# Patient Record
Sex: MALE | Race: WHITE
[De-identification: names, ages, dates, MRNs, and addresses within clinical notes are randomized per-mention and may not be internally consistent; named-entity substitution may affect disease eponyms.]

---

## 2019-11-27 ENCOUNTER — HOSPITAL ENCOUNTER (EMERGENCY)
Dept: HOSPITAL 25 - UCEAST | Age: 79
Discharge: HOME | End: 2019-11-27
Payer: MEDICARE

## 2019-11-27 VITALS — SYSTOLIC BLOOD PRESSURE: 132 MMHG | DIASTOLIC BLOOD PRESSURE: 64 MMHG
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
## 2019-11-27 DIAGNOSIS — Z87.891: ICD-10-CM

## 2019-11-27 DIAGNOSIS — S61.211A: Primary | ICD-10-CM
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
## 2019-11-27 DIAGNOSIS — Z88.5: ICD-10-CM

## 2019-11-27 DIAGNOSIS — Z79.82: ICD-10-CM

## 2019-11-27 DIAGNOSIS — I10: ICD-10-CM

## 2019-11-27 DIAGNOSIS — K21.9: ICD-10-CM

## 2019-11-27 DIAGNOSIS — E11.9: ICD-10-CM

## 2019-11-27 PROCEDURE — 12001 RPR S/N/AX/GEN/TRNK 2.5CM/<: CPT

## 2019-11-27 PROCEDURE — 99213 OFFICE O/P EST LOW 20 MIN: CPT

## 2019-11-27 PROCEDURE — 99212 OFFICE O/P EST SF 10 MIN: CPT

## 2019-11-27 PROCEDURE — G0463 HOSPITAL OUTPT CLINIC VISIT: HCPCS

## 2019-11-27 NOTE — ED
Laceration/Wound HPI





- HPI Summary


HPI Summary: 


79-year-old male presents with complaints of laceration to the tip of his left 

index finger.  States he accidentally cut his finger on with a sharp knife 

while deboning a pork chop about 1 hour ago.  Bleeding was controlled prior to 

arrival with direct pressure.  Reports full range of motion to the finger.  

Last tetanus was approximately 2 years ago.  Denies any numbness or tingling.








- History of Current Complaint


Stated Complaint: FINGER LAC


Time Seen by Provider: 11/27/19 16:29


Hx Obtained From: Patient


Pain Intensity: 0





- Additional Pertinent History


Primary Care Physician: [id number]





- Allergy/Home Medications


Allergies/Adverse Reactions: 


 Allergies











Allergy/AdvReac Type Severity Reaction Status Date / Time


 


oxycodone Allergy  Altered Verified 11/27/19 14:52





   Mental  





   Status  














PMH/Surg Hx/FS Hx/Imm Hx


Endocrine/Hematology History: Reports: Hx Diabetes - Type 2


Cardiovascular History: Reports: Hx Hypercholesterolemia, Hx Hypertension


   Denies: Hx Pacemaker/ICD


Respiratory History: Reports: Hx Sleep Apnea - pt stated he cannot tolerate 

wearing mask


   Denies: Hx Asthma


GI History: Reports: Hx Gastroesophageal Reflux Disease


 History: Reports: Other  Problems/Disorders - sees Dr. Garcia regularly 

for prostate


Musculoskeletal History: Reports: Hx Arthritis, Hx Back Problems


Sensory History: 


   Denies: Hx Cataracts, Hx Contacts or Glasses, Hx Glaucoma, Hx Hearing Aid


Opthamlomology History: 


   Denies: Hx Cataracts, Hx Contacts or Glasses, Hx Glaucoma


Neurological History: Reports: Other Neuro Impairments/Disorders - HX OF PINEAL 

CYCST MANY YEARS AGO/PAIN CLINIC PATIENT


Psychiatric History: 


   Denies: Hx Panic Disorder





- Surgical History


Surgery Procedure, Year, and Place: VERICOCELE 35 YRS AGO.  CYST REMOVED FROM 

INFRONT OF RT EAR. AS A CHILD.  LEFT HYDROCELE 2009 [place] [place]  LAMINECTOMY 4/5 12 /19/16





- Immunization History


Date of Tetanus Vaccine: 2 years ago


Infectious Disease History: Yes


Infectious Disease History: Reports: Hx Shingles - 2014


   Denies: Traveled Outside the [place] in Last 30 Days





- Family History


Known Family History: Positive: Cardiac Disease





- Social History


Occupation: Retired


Lives: With Family


Alcohol Use: Weekly


Alcohol Amount: 2-4 oz vodka daily


Substance Use Type: Reports: None


Smoking Status (MU): Former Smoker


Type: Cigars


Amount Used/How Often: 3 cigars/week


Have You Smoked in the Last Year: Yes





Review of Systems


Constitutional: Negative


Cardiovascular: Negative


Respiratory: Negative


Gastrointestinal: Negative


Genitourinary: Negative


Negative: Decreased ROM


Skin: Other - See HPI


Neurological: Negative


All Other Systems Reviewed And Are Negative: Yes





Physical Exam





- Summary


Physical Exam Summary: 


GENERAL APPEARANCE: Well developed, well nourished, alert and cooperative, and 

appears to be in no acute distress.





CARDIAC: Normal S1 and S2. No S3, S4 or murmurs. Rhythm is regular. There is no 

peripheral edema, cyanosis or pallor. Extremities are warm and well perfused. 

Capillary refill is less than 2 seconds. Peripheral pulses intact.





LUNGS: Clear to auscultation without rales, rhonchi, wheezing or diminished 

breath sounds.





ABDOMEN: Positive bowel sounds. Soft, nondistended, nontender. No guarding or 

rebound. No masses or hepatosplenomegally.





MUSKULOSKELETAL: ROM intact to all extremities. No joint erythema or 

tenderness. Normal muscular development. Normal gait.





EXTREMITIES: Linear superficial c-shaped flap laceration to the pad of his 

distal left index finger with bleeding controlled. No nail involvement.





SKIN: Skin normal color, texture and turgor.





Triage Information Reviewed: Yes


Vital Signs On Initial Exam: 


 Initial Vitals











Temp Pulse Resp BP Pulse Ox


 


 98.5 F   61   16   136/58   97 


 


 11/27/19 14:46  11/27/19 14:46  11/27/19 14:46  11/27/19 14:46  11/27/19 14:46











Vital Signs Reviewed: Yes





Procedures





- Laceration/Wound Repair


  ** 1


Location: upper extremity - left index finger


Description: Linear - flap laceration


Length, Depth and Shape: superficial 1 cm c-shaped flap laceration


Irrigated w/ Saline (ccs): 500


Laceration/Wound Explored: clean


Closure: Skin Adhesive, SteriStrips





Diagnostics





- Vital Signs


 Vital Signs











  Temp Pulse Resp BP Pulse Ox


 


 11/27/19 14:46  98.5 F  61  16  136/58  97














- Laboratory


Lab Statement: Any lab studies that have been ordered have been reviewed, and 

results considered in the medical decision making process.





Laceration Repair Course/Dx





- Course


Course Of Treatment: 79-year-old male presents with complaints of laceration to 

the tip of his left index finger.  States he accidentally cut his finger on 

with a sharp knife while deboning a pork chop about 1 hour ago.  Bleeding was 

controlled prior to arrival with direct pressure.  Reports full range of motion 

to the finger.  Last tetanus was approximately 2 years ago.  Denies any 

numbness or tingling.  Afebrile. Vital signs stable.  On exam patient was noted 

to have a linear superficial flap laceration to the pad of his distal left 

index finger with bleeding controlled. No nail involvement. The wound was 

thoroughly irrigated by the RN prior to wound repair.  The wound margins were 

well approximated therefore decision was made to close the wound using Steri-

Strips and a skin adhesive.  A clean gauze dressing was applied by the RN.  

Wound care, anticipatory guidance, and warning symptoms were reviewed with the 

patient.  Verbalizes understanding and agrees with plan of care.





- Differential Dx


Differental Diagnoses: Laceration, Tendon Laceration





- Clinical Impression


Provider Diagnoses: 


 Laceration of left index finger








Discharge ED





- Sign-Out/Discharge


Documenting (check all that apply): Patient Departure


All imaging exams completed and their final reports reviewed: No Studies





- Discharge Plan


Condition: Stable


Disposition: HOME


Patient Education Materials:  Finger Laceration (ED), Skin Adhesive Care (ED), 

Steristrips (ED)


Referrals: 


Claudia Sung MD [Primary Care Provider] - 


Additional Instructions: 


Your laceration as repaired with a combination of skin adhesive and Steri-

Strips.





The adhesive will slowly wear off over the next several days.





Keep the adhesive dry for the next 24 hours. After 24 hours you may shower and 

wash your hands as usual.





Do not apply any lotions or ointments to the adhesive as this may dissolve the 

adhesive and cause the wound to reopen.





The Steri-Strips will slowly peel up from the ends over the next few days. You 

may trim the ends as needed but do not pull off or you may reopen the wound.





Keep the wound covered with a dressing. Change this at least once a day or 

anytime the dressing becomes wet or soiled.





Take acetaminophen (Tylenol) or ibuprofen (Advil, Motrin) according to 

directions as needed for pain.





Watch for signs of infection including fever greater than 100.5 F, severe pain 

not managed with with pain medicine, redness that spreads, swelling of the 

finger, pus draining from the wound, or any worsening of symptoms. Seek 

immediate medical attention if any of these occur.





- Billing Disposition and Condition


Condition: STABLE


Disposition: Home

## 2019-12-03 NOTE — XMS REPORT
Continuity of Care Document (CCD)

 Created on:2019



Patient:Harjinder Walsh

Sex:Male

:1940

External Reference #:[id number]





Demographics







 Address  [address] [place] [address] [address]



   [place] [address]

 

 Home Phone  [phone]

 

 Mobile Phone  [phone]

 

 Work Phone  [phone]

 

 Email Address  [email or web address]

 

 Preferred Language  en

 

 Marital Status  Not  or 

 

 Latter day Affiliation  Unknown

 

 Race  White

 

 Ethnic Group  Not  or 









Author







 Name  Claudia Taylor MD

 

 Address  [address] [place] [id number]









Care Team Providers







 Name  Role  Phone

 

 Carrillo Gale M.D. (Neurosurgeon)  Care Team Information   [phone]
[phone]

 

 Griselda Heredia  Care Team Information   [phone]









Problems







 Active Problems  Provider  Date

 

 Benign essential hypertension  Cathie Witt RN MS FNP  Onset: 2007

 

 Pure hypercholesterolemia  Cathie Witt RN MS FNP  Onset: 2007

 

 Peptic reflux disease  Cathie Witt, RN MS FNP  Onset: 2007

 

 Type 2 diabetes mellitus  Claudia Taylor MD  Onset: 2011

 

 Mixed hyperlipidemia  Claudia Taylor MD  Onset: 2018

 

 Essential hypertension  Claudia Taylor MD  Onset: 2016







Social History







 Type  Date  Description  Comments

 

 Birth Sex    Unknown  

 

 Tobacco Use  Start: Unknown  Currently Smokes an Occasional  quit



     Cigar  

 

 ETOH Use    consumes 3-4 glasses per week  

 

 Tobacco Use  Start: Unknown End: Unknown  Patient is a former smoker  

 

 Smoking Status  Reviewed: 10/19/18  Patient is a former smoker  







Allergies, Adverse Reactions, Alerts







 Active Allergies  Reaction  Severity  Comments  Date

 

 Oxycodone      mental status changes  2017









 Inactive Allergies









 NKDA        2007







Medications







 Active Medications  SIG  Qnty  Indications  Ordering  Date



         Provider  

 

 Meclizine HCL  take 1/2-1 tablet  15tabs  H81.10  Claudia Taylor  10/08/2019



             25mg  by mouth three      MD ARIES  



 Tablets  times daily as        



   needed        

 

 Magnesium Oxide  1 by mouth every    E83.42  Claudia Taylor  2018



               250mg  night at bedtime      MD ARIES  



 Tablets          



           

 

 Cpap      G47.33  [facility]  04/10/2018



         MD ARIES  

 

 Cardizem LA  1 by mouth every  90tabs  I10  [facility]  2018



           240mg  day      MD ARIES  



 Tablets ER 24HR          



           

 

 Atorvastatin Calcium  Take 1 Tablet AT  90tabs  E78.2  [facility]  2018



   Bedtime      MD ARIES  



 20mg Tablets          



           

 

 Nystatin  apply to affected  90gm    [facility]  2018



        [id number]/GM  area(s) two times      MD ARIES  



 Cream  a day        



           

 

 Valacyclovir HCL  Take Two Tablets  30tabs    [facility]  2017



                1gm  By Mouth Twice A      M,MD  



 Tablets  Day For 1 Day as        



   Needed For Cold        



   Sore        

 

 Aspirin  1 po qd      [facility]  2014



       81mg Tablets        MD ARIES  



           

 

 Losartan  take 1 tablet  90tabs  I10  [facility]  2011



 Potassium/Hydrochloro  daily      MD ARIES  



 thiazide          



        100-25mg          



 Tablets          



           

 

 Test Strips  For FSBG'S qd  1Box  E11.9  [facility]  2010



         MD ARIES  

 

 Lancets  for fsbg's  qam  1Box  E11.9  [facility]  2010



        Misc  dx: dm 2      MD ARIES  



           

 

 Omeprazole  1 by mouth twice  180caps  K21.0  [facility]  2010



          20mg  a day      MD ARIES  



 Capsules DR          



           

 

 Vitamin D  2 po qd    E55.9  [facility]  2009



         1000Unit        MD ARIES  



 Capsules          



           

 

 Metformin HCL ER  2 by mouth twice  360tabs  E11.9  [facility]  2009



                500mg  a day      MD ARIES  



 Tablets ER 24HR          



           







Immunizations







 CPT Code  Status  Date  Vaccine  Lot #

 

 [id number]  Given  10/04/2019  Shingrix (Shingles) Zoster Vaccine HZV,  



       Recombinant, Subunit, Adj  

 

 37317  Given  10/04/2019  Fluzone Highdose Age 65 And Over Preservative &  



       Antibiotic Free  

 

 56064  Given  2019  Shingrix (Shingles) Zoster Vaccine HZV,  



       Recombinant, Subunit, Adj  

 

 43340  Given  2018  Influenza Vac, Quadrivalent, Split, 0.5mL Dosage,  
[id number]



       Im Use  

 

 40917  Given  2017  Pneumococcal 23 Immunization Adult Or  [id number]



       Immunosuppressed Patient  

 

 88218  Given  2017  Influenza Vac, Quadrivalent, Split, 0.5mL Dosage,  
[id number]



       Im Use  

 

 84675  Given  2016  Tdap (Adacel) Ages 7 And Above Only  [id number]

 

 92874  Given  09/10/2016  Influenza Virus Vaccine,Quadrivalent,Split,Preserv  



       Free, 0.5mL,Im  

 

 95407  Given  2016  Influenza Vac, Quadrivalent, Split, 0.5mL Dosage,  



       Im Use  

 

 10218  Given  10/22/2015  Afluria Or Fluvirin Flu Vac Intramuscular  

 

 54236  Given  2015  Prevnar 13 Pneumococal Conjugate Vaccine  [id number]

 

 79993  Given  2015  Zoster (Zostavax)  

 

   Given  2014  Fluzone Trivalent Immunization  [id number]

 

   Given  10/07/2013  Fluzone Trivalent Immunization  [id number]

 

   Given  10/11/2012  Fluzone Trivalent Immunization  [id number]

 

   Given  2011  Fluzone Trivalent Immunization  [id number]

 

 65597  Given  2010  Afluria Or Fluvirin Flu Vac Intramuscular  [id number]

 

 55586  Given  2009  Influenza Virus Vaccine Pandemic Formulation -  
[id number]



       [id number]  

 

 29505  Given  2009  Afluria Or Fluvirin Flu Vac Intramuscular  

 

 21377  Given  10/21/2008  Pneumococcal 23 Immunization Adult Or  [id number]



       Immunosuppressed Patient  

 

 81290  Given  10/16/2007  Afluria Or Fluvirin Flu Vac Intramuscular  

 

 37514  Given  10/16/2007  Afluria Or Fluvirin Flu Vac Intramuscular  [id number]







Vital Signs







 Date  Vital  Result  Comment

 

 10/08/2019  8:20am  Weight  247.00 lb  









 Heart Rate  68 /min  

 

 BP Systolic  142 mmHg  

 

 BP Diastolic  66 mmHg  

 

 BP Systolic Recheck  136 mmHg  

 

 BP Diastolic Recheck  64 mmHg  

 

 Height  69 inches  5'9"

 

 BMI (Body Mass Index)  36.5 kg/m2  









 2019 11:33am  Weight  233.00 lb  









 Heart Rate  72 /min  

 

 BP Systolic  114 mmHg  

 

 BP Diastolic  64 mmHg  

 

 Height  69 inches  5'9"

 

 BMI (Body Mass Index)  34.4 kg/m2  







Results







 Test  Date  Facility  Test  Result  H/L  Range  Note

 

 Laboratory test finding  10/08/2019  [facility]  Hemoglobin A1c  <pending>      

 

 Laboratory test finding  10/08/2019  [facility]  Magnesium  <pending>      









 Vit D 25Oh  <pending>      









 Laboratory test  10/01/2019  [facility]  PSA Diagnostic  6.501 ng/
mL  High  0-4.0  1



 finding              









 1  Serum levels of PSA measured using the Lakisha Arelis



   DXI Hybritech immunoassay should not be interpreted as



   absolute evidence of the presence or absence of disease.



   The PSA value should be used in conjunction with other



   pertinent clinical diagnostic procedures.



   



   The values obtained with different assay methods or kits



   cannot be used interchangeably.







Procedures







 Date  Code  Description  Status

 

 10/08/2019  26047  Electrocardiogram Complete  Completed

 

 2010  03025402  Colonoscopy  Completed







Medical Devices







 Description

 

 No Information Available







Encounters







 Description

 

 No Information Available







Assessments







 Date  Code  Description  Provider

 

 10/08/2019  E66.9  Obesity, unspecified  Claudia Taylor MD

 

 10/08/2019  Z00.01  Encounter for general adult medical  Claudia Taylor MD



     examination with abnormal findings  

 

 10/08/2019  I10  Essential (primary) hypertension  Claudia Taylor MD

 

 10/08/2019  E11.9  Type 2 diabetes mellitus without  Claudia Taylor MD



     complications  

 

 10/08/2019  E55.9  Vitamin D deficiency, unspecified  Claudia Taylor MD

 

 10/08/2019  I77.810  Thoracic aortic ectasia  Claudia Taylor MD

 

 10/08/2019  M48.07  Spinal stenosis, lumbosacral region  Claudia Taylor MD

 

 10/08/2019  E78.2  Mixed hyperlipidemia  Claudia Taylor MD

 

 10/08/2019  K21.0  Gastro-esophageal reflux disease with  Claudia Taylor MD



     esophagitis  

 

 10/08/2019  G47.33  Obstructive sleep apnea (adult) (pediatric)  Claudia Taylor MD

 

 10/08/2019  Z12.11  Encounter for screening for malignant  Claudia Taylor MD



     neoplasm of colon  

 

 10/08/2019  Z13.31  Encounter for screening for depression  Claduia Taylor MD

 

 10/08/2019  E83.42  Hypomagnesemia  Claudia Taylor MD

 

 10/08/2019  H81.10  Benign paroxysmal vertigo, unspecified ear  Claudia Taylor MD

 

 10/08/2019  Z68.36  Body mass index (BMI) 36.0-36.9, adult  Claudia Taylor MD







Plan of Treatment

10/08/2019 - Claudia Taylor MDE66.9 Obesity, nnvtysvntfpW16.01 Encounter 
for general adult medical examination with abnormal feyzokqeL92 Essential (
primary) ykcutrbaglxtR17.9 Type 2 diabetes mellitus without hgirfnmekthyxD52.9 
Vitamin D deficiency, cbdhyuchxknR01.810 Thoracic aortic vrmggbyP08.07 Spinal 
stenosis, lumbosacral emnxahJ37.2 Mixed hyperlipidemiaFollow up:4 mosK21.0 
Gastro-esophageal reflux disease with qzxfdqptdlmV09.33 Obstructive sleep apnea 
(adult) (pediatric)Z12.11 Encounter for screening for malignant neoplasm of 
lixiyG17.31 Encounter for screening for rnnykfzdxgQ44.42 MofibillolgvqtN16.10 
Benign paroxysmal vertigo, unspecified earNew Medication:Meclizine HCL 25 mg - 
take 1/2-1 tablet by mouth three times daily as jrxwidK49.36 Body mass index (
BMI) 36.0-36.9, adult



Functional Status







 Description

 

 No Information Available







Mental Status







 Description

 

 No Information Available







Referrals







 Description

 

 No Information Available

## 2019-12-03 NOTE — XMS REPORT
Continuity of Care Document (CCD)

 Created on:2019



Patient:Harjinder Walsh

Sex:Male

:1940

External Reference #:[id number]





Demographics







 Address  [address] [place] [address] [address]



   [place] [address]

 

 Home Phone  [phone]

 

 Mobile Phone  [phone]

 

 Email Address  [email or web address]

 

 Preferred Language  en

 

 Marital Status  Not  or 

 

 Episcopal Affiliation  Unknown

 

 Race  White

 

 Ethnic Group  Not  or 









Author







 Name  Griselda Heredia M.D. (transmitted by agent of provider Fátima Wiggins)

 

 Address  [address] [place] [address]









Care Team Providers







 Name  Role  Phone

 

 Claudia Taylor MD - Internal  Care Team Information   [phone]
[phone]



 Medicine    









Problems







 Active Problems  Provider  Date

 

 Morbid obesity  Griselda Heredia M.D.  Onset: 2016

 

 Mixed hyperlipidemia  rGiselda Heredia M.D.  Onset: 2016

 

 Atherosclerotic heart disease of native  Griselda Heredia M.D.  Onset: 2016



 coronary artery with unspecified angina    



 pectoris    

 

 Body mass index 40+ - severely obese  Griselda Heredia M.D.  Onset: 05/10/2016

 

 Atherosclerotic heart disease of native  Griselda Heredia M.D.  Onset: 05/10/2016



 coronary artery without angina pectoris    

 

 Essential hypertension  Griselda Heredia M.D.  Onset: 2017

 

 Neurogenic claudication co-occurrent and due to  Bobby Kruse M.D.  Onset: 



 spinal stenosis of lumbar region    







Social History







 Type  Date  Description  Comments

 

 Birth Sex    Unknown  

 

 Tobacco Use  Start: Unknown  Never Smoked Cigarettes  

 

 ETOH Use    Denies alcohol use  no alcohol since 

 

 ETOH Use    Consumes 1 glass of  



     wine per day  

 

 Recreational Drug Use    Denies Drug Use  

 

 Tobacco Use  Start: Unknown  Patient is a former  smoked occasional



   End: Unknown  smoker  cigar- pt no longer



       smokes, pt only chews



       on them

 

 Smoking Status  Reviewed: 10/22/19  Patient is a former  smoked occasional



     smoker  cigar- pt no longer



       smokes, pt only chews



       on them

 

 Exercise Type/Frequency    Exercises sporadically  







Allergies, Adverse Reactions, Alerts







 Active Allergies  Reaction  Severity  Comments  Date

 

 Oxycodone  unconsciousness  Severe    2017









 Inactive Allergies









 NKDA        2014







Medications







 Active Medications  SIG  Qnty  Indications  Ordering Provider  Date

 

 Magnesium Oxide  1 by mouth  90caps    Griselda Heredia M.D.  2017



              400mg  every day        



 Capsules          



           

 

 Atorvastatin Calcium  Take 1 tab (20  60tabs    Macadam, Claudia  



                   20mg  mg) MAURICIO WALLS MD  



 Tablets          



           

 

 Omeprazole  1 t po bid      Unknown  



         20mg Capsules          



 DR          

 

 Hyzaar  1 by mouth  90tabs    Unknown  



     100-25mg Tablets  every day        



           

 

 Aspir-81  1 by mouth      Unknown  



       81mg Tablets DR  every day        



           

 

 Diltiazem HCL ER  1 by mouth      Unknown  



               240mg  every day        



 Caps ER 24HR          



           

 

 Multivitamin Adult  1 by mouth      Unknown  



   every day        



 Tablets          



           

 

 Vitamin D-3  1 by mouth      Unknown  



          1000Unit  every day        



 Capsules          



           

 

 Cpap  nasal Cpap      Unknown  



    Device          



           







Medications Administered in Office







 Medication  SIG  Qnty  Indications  Ordering Provider  Date

 

 Technetium TC 99M        Griselda Heredia M.D.  11/10/2017



 Tetrofosmin, Per Unit Dose          



 Up To 40 Millicuries          



              Injection          



           

 

 Technetium TC 99M        Servando Gage M.D.,  2017



 Tetrofosmin, Per Unit Dose        [facility]  



 Up To 40 Millicuries          



              Injection          



           

 

 Inj, Regadenoson, 0.1 MG        Robert Wagner, DO [facility]  2016



                  Injection          



           

 

 Technetium TC 99M        Robert Wagner, DO [facility]  2016



 Tetrofosmin, Per Unit Dose          



 Up To 40 Millicuries          



              Injection          



           







Immunizations







 Description

 

 No Information Available







Vital Signs







 Date  Vital  Result  Comment

 

 10/22/2019  8:36am  Height  69 inches  5'9"









 Weight  244.00 lb  with shoes

 

 Heart Rate  74 /min  

 

 BP Systolic Sitting  136 mmHg  Ra

 

 BP Diastolic Sitting  76 mmHg  Ra

 

 BP Systolic Standing  126 mmHg  Ra

 

 BP Diastolic Standing  70 mmHg  Ra

 

 BMI (Body Mass Index)  36.0 kg/m2  

 

 Ejection Fraction  65-70%  Echo 2016









 10/23/2018  8:41am  Height  69 inches  5'9"









 Weight  231.00 lb  

 

 Heart Rate  74 /min  regular

 

 BP Systolic Sitting  122 mmHg  large cuff  left arm

 

 BP Diastolic Sitting  70 mmHg  large cuff  left arm

 

 BP Systolic Standing  134 mmHg  

 

 BP Diastolic Standing  72 mmHg  

 

 Respiratory Rate  16 /min  

 

 Pain Level  0  

 

 O2 % BldC Oximetry  97 %  

 

 BMI (Body Mass Index)  34.1 kg/m2  







Results







 Description

 

 No Information Available







Procedures







 Date  Code  Description  Status

 

 10/22/2019  34811  EKG Tracing & Interpretation  Completed







Medical Devices







 Description

 

 No Information Available







Encounters







 Type  Date  Location  Provider  Dx  Diagnosis

 

 Office Visit  10/22/2019  [facility]  Griselda Heredia  I25.10  Athscl 
heart



   9:00a  Of Mary HUTCHINSON    disease of native



           coronary artery



           w/o ang pctrs









 E78.2  Mixed hyperlipidemia

 

 I10  Essential (primary) hypertension

 

 R00.2  Palpitations

 

 Z68.36  Body mass index (BMI) 36.0-36.9, adult







Assessments







 Date  Code  Description  Provider

 

 10/22/2019  I25.10  Atherosclerotic heart disease of native coronary  Griselda Heredia M.D.



     artery without angina pectoris  

 

 10/22/2019  E78.2  Mixed hyperlipidemia  Griselda Heredia M.D.

 

 10/22/2019  I10  Essential (primary) hypertension  Griselda Heredia M.D.

 

 10/22/2019  R00.2  Palpitations  Griselda Heredia M.D.

 

 10/22/2019  Z68.36  Body mass index (BMI) 36.0-36.9, adult  Griselda Heredia M.D.







Plan of Treatment

10/22/2019 - Griselda Heredia M.D.I25.10 Atherosclerotic heart disease of native 
coronary artery without angina pectorisComments:Your ECG is normal.Follow up:1 
yearE78.2 Mixed hyperlipidemiaComments:Good control. LDL goal is &lt;= 70 with 
coronary artery disease.Recommendations:Continue current medication  Healthy 
fats are nuts, oily fish and more. Avoid excessive fatty meats.For your 
elevated triglycerides and weight: Minimize foods with refined sugar, flour.  
Low "white" foods, white flour, sugar, rice, potatoes.I10 Essential (primary) 
hypertensionComments:Well controlled on Diltiazem, Hyzaar.R00.2 
PalpitationsComments:With rest, improved with Magnesium, history of PVC'
s.Z68.36 Body mass index (BMI) 36.0-36.9, adultComments:Continue to work on diet
: smaller portions, more regular meals to decrease the chance of glucose/sugar 
problems.  Mediterranean diet, avoid/minimize concentrated sweets and refined 
carbohydrates: low "white" foods, white sugar, white flour, white rice, 
potatoes...



Functional Status







 Description

 

 No Information Available







Mental Status







 Description

 

 No Information Available







Referrals







 Description

 

 No Information Available

## 2019-12-03 NOTE — XMS REPORT
Continuity of Care Document (CCD)

 Created on:October 10, 2019



Patient:Harjinder Walsh

Sex:Male

:1940

External Reference #:[id number]





Demographics







 Address  [address] [place] [address] [address]



   [place] [address]

 

 Home Phone  [phone]

 

 Mobile Phone  [phone]

 

 Work Phone  [phone]

 

 Email Address  [email or web address]

 

 Preferred Language  en

 

 Marital Status  Not  or 

 

 Worship Affiliation  Unknown

 

 Race  White

 

 Ethnic Group  Not  or 









Author







 Name  Dora George









Care Team Providers







 Name  Role  Phone

 

 Carrillo Gale M.D. (Neurosurgeon)  Care Team Information   [phone]
[phone]

 

 Griselda Heredia  Care Team Information   [phone]









Problems







 Active Problems  Provider  Date

 

 Benign essential hypertension  Cathie Witt RN MS FNP  Onset: 2007

 

 Pure hypercholesterolemia  Cathie Witt RN MS FNP  Onset: 2007

 

 Peptic reflux disease  Cathie Witt RN MS FNP  Onset: 2007

 

 Type 2 diabetes mellitus  Claudia Taylor MD  Onset: 2011

 

 Mixed hyperlipidemia  Claudia Taylor MD  Onset: 2018

 

 Essential hypertension  Claudia Taylor MD  Onset: 2016







Social History







 Type  Date  Description  Comments

 

 Birth Sex    Unknown  

 

 Tobacco Use  Start: Unknown  Currently Smokes an Occasional  quit



     Cigar  

 

 ETOH Use    consumes 3-4 glasses per week  

 

 Tobacco Use  Start: Unknown End: Unknown  Patient is a former smoker  

 

 Smoking Status  Reviewed: 10/19/18  Patient is a former smoker  







Allergies, Adverse Reactions, Alerts







 Active Allergies  Reaction  Severity  Comments  Date

 

 Oxycodone      mental status changes  2017









 Inactive Allergies









 NKDA        2007







Medications







 Active Medications  SIG  Qnty  Indications  Ordering  Date



         Provider  

 

 Meclizine HCL  take 1/2-1 tablet  15tabs  H81.10  Claudia Taylor  10/08/2019



             25mg  by mouth three      MD ARIES  



 Tablets  times daily as        



   needed        

 

 Magnesium Oxide  1 by mouth every    E83.42  Claudia Taylor  2018



               250mg  night at bedtime      MD ARIES  



 Tablets          



           

 

 Cpap      G47.33  [facility]  04/10/2018



         MD ARIES  

 

 Cardizem LA  1 by mouth every  90tabs  I10  [facility]  2018



           240mg  day      MD ARIES  



 Tablets ER 24HR          



           

 

 Atorvastatin Calcium  Take 1 Tablet AT  90tabs  E78.2  [facility]  2018



   Bedtime      MD ARIES  



 20mg Tablets          



           

 

 Nystatin  apply to affected  90gm    [facility]  2018



        [id number]/GM  area(s) two times      MD ARIES  



 Cream  a day        



           

 

 Valacyclovir HCL  Take Two Tablets  30tabs    [facility]  2017



                1gm  By Mouth Twice A      M,MD  



 Tablets  Day For 1 Day as        



   Needed For Cold        



   Sore        

 

 Aspirin  1 po qd      [facility]  2014



       81mg Tablets        MD ARIES  



           

 

 Losartan  take 1 tablet  90tabs  I10  [facility]  2011



 Potassium/Hydrochloro  daily      MD ARIES  



 thiazide          



        100-25mg          



 Tablets          



           

 

 Test Strips  For FSBG'S qd  1Box  E11.9  [facility]  2010



         MD ARIES  

 

 Lancets  for fsbg's  qam  1Box  E11.9  [facility]  2010



        Misc  dx: dm 2      MD ARIES  



           

 

 Omeprazole  1 by mouth twice  180caps  K21.0  [facility]  2010



          20mg  a day      MD ARIES  



 Capsules DR          



           

 

 Vitamin D  2 po qd    E55.9  [facility]  2009



         1000Unit        MD ARIES  



 Capsules          



           

 

 Metformin HCL ER  2 by mouth twice  360tabs  E11.9  [facility]  2009



                500mg  a day      MD ARIES  



 Tablets ER 24HR          



           







Immunizations







 CPT Code  Status  Date  Vaccine  Lot #

 

 [id number]  Given  10/04/2019  Shingrix (Shingles) Zoster Vaccine HZV,  



       Recombinant, Subunit, Adj  

 

 73460  Given  10/04/2019  Fluzone Highdose Age 65 And Over Preservative &  



       Antibiotic Free  

 

 26883  Given  2019  Shingrix (Shingles) Zoster Vaccine HZV,  



       Recombinant, Subunit, Adj  

 

 92614  Given  2018  Influenza Vac, Quadrivalent, Split, 0.5mL Dosage,  
[id number]



       Im Use  

 

 16208  Given  2017  Pneumococcal 23 Immunization Adult Or  [id number]



       Immunosuppressed Patient  

 

 29980  Given  2017  Influenza Vac, Quadrivalent, Split, 0.5mL Dosage,  
[id number]



       Im Use  

 

 55890  Given  2016  Tdap (Adacel) Ages 7 And Above Only  [id number]

 

 49209  Given  09/10/2016  Influenza Virus Vaccine,Quadrivalent,Split,Preserv  



       Free, 0.5mL,Im  

 

 97782  Given  2016  Influenza Vac, Quadrivalent, Split, 0.5mL Dosage,  



       Im Use  

 

 57498  Given  10/22/2015  Afluria Or Fluvirin Flu Vac Intramuscular  

 

 59693  Given  2015  Prevnar 13 Pneumococal Conjugate Vaccine  [id number]

 

 50290  Given  2015  Zoster (Zostavax)  

 

   Given  2014  Fluzone Trivalent Immunization  [id number]

 

   Given  10/07/2013  Fluzone Trivalent Immunization  [id number]

 

   Given  10/11/2012  Fluzone Trivalent Immunization  [id number]

 

   Given  2011  Fluzone Trivalent Immunization  [id number]

 

 41352  Given  2010  Afluria Or Fluvirin Flu Vac Intramuscular  [id number]

 

 20528  Given  2009  Influenza Virus Vaccine Pandemic Formulation -  
[id number]



       32653-801-37  

 

 14117  Given  2009  Afluria Or Fluvirin Flu Vac Intramuscular  

 

 38777  Given  10/21/2008  Pneumococcal 23 Immunization Adult Or  [id number]



       Immunosuppressed Patient  

 

 64690  Given  10/16/2007  Afluria Or Fluvirin Flu Vac Intramuscular  

 

 71296  Given  10/16/2007  Afluria Or Fluvirin Flu Vac Intramuscular  [id number]







Vital Signs







 Date  Vital  Result  Comment

 

 10/08/2019  8:20am  Weight  247.00 lb  









 Heart Rate  68 /min  

 

 BP Systolic  142 mmHg  

 

 BP Diastolic  66 mmHg  

 

 BP Systolic Recheck  136 mmHg  

 

 BP Diastolic Recheck  64 mmHg  

 

 Height  69 inches  5'9"

 

 BMI (Body Mass Index)  36.5 kg/m2  









 2019 11:33am  Weight  233.00 lb  









 Heart Rate  72 /min  

 

 BP Systolic  114 mmHg  

 

 BP Diastolic  64 mmHg  

 

 Height  69 inches  5'9"

 

 BMI (Body Mass Index)  34.4 kg/m2  







Results







 Test  Date  Facility  Test  Result  H/L  Range  Note

 

 CBC with Auto Diff-fcmg  10/08/2019  [facility]  WBC  6.7 K/uL    4.1-11.0  1









 RBC  4.82 M/uL    4.60-6.10  

 

 Hemoglobin  14.7 gm/dL    13.5-18.0  

 

 Hematocrit  42.3 %    41.0-53.0  

 

 MCV  87.8 fL    80.0-97.0  

 

 MCH  30.6 pg    27.0-32.0  

 

 MCHC  34.8 g/dL    32.0-36.0  

 

 RDW  14.3 %    11.5-14.5  

 

 PLT Count  179 K/ul    140-400  

 

 MPV  8.9 FL    7.1-10.7  

 

 Neutrophil  65.2 %    35.0-75.0  

 

 Lymphocyte  21.4 %    16.0-52.0  

 

 Monocyte  10.6 %  High  2.0-10.0  

 

 Eosinophil  2.2 %    0.0-5.0  

 

 Basophil  0.6 %    0.0-4.0  

 

 Abs Neutrophils  4.3 K/uL    2.1-8.0  

 

 Abs Lymphocytes  1.4 K/uL    0.8-5.5  

 

 Abs Monocytes  0.7 K/uL    0.1-1.0  

 

 Abs Eosinophils  0.1 K/uL    0.0-0.5  

 

 Abs Basophils  0.0 K/uL    0.0-0.3  









 Comprehensive Met Panel-[facility]  10/08/2019  [facility]  Sodium  134 mmol/L  Low  135
-146  2









 Potassium  4.3 mmol/L    3.5-5.2  

 

 Chloride#  95 mmol/L  Low    3

 

 Carbon Dioxide  31 mmol/L    24-34  

 

 Calcium  9.7 mg/dL    8.5-10.5  4

 

 Glucose  103 mg/dL      

 

 BUN  14 mg/dL    6-26  

 

 Creatinine  0.7 mg/dL    0.5-1.4  

 

 Total Protein  6.8 g/dL    6.0-8.0  

 

 Albumin  4.1 g/dL    3.6-4.9  

 

 Globulin  2.7 g/dL    2.0-3.5  

 

 A/G Ratio  1.5 Ratio    1.0-2.2  

 

 Total Bilirubin  0.6 mg/dL    0.1-1.3  

 

 Alkaline Phosphatase  56 U/L      

 

 Alt  16 U/L    3-42  

 

 Ast  21 U/L    8-42  

 

 Anion Gap  8 mmol/L    5-15  5

 

 Female Egfr  84    >60  6

 

 Male Egfr  91    >60  7









 Hemoglobin A1c  10/08/2019  [facility]  Hemoglobin A1c  5.8 %    4.1-5.9  









 Estimated Average Glucose Calc  120 mg/dL      









 Lipid  10/08/2019  [facility]  Cholesterol  136 mg/dL      









 Triglycerides  90 mg/dL      

 

 HDL  47 mg/dL    29-71  8

 

 Chol/ HDL Ratio  2.9 ratio  Low  4.0-6.7  

 

 VLDL  18 mg/dL    2-29  

 

 LDL (Calc)  72 mg/dL    20-99  9









 Laboratory test finding  10/08/2019  [facility]  Magnesium  1.8 mg/dL    1.5-2.7  









 Vitamin D 25 Hydroxy  48 ng/mL      10









 Microalb/Creat Panel  10/08/2019  [facility]  Creatinine, Urine  74.0 mg/dL      
11









 Microalbumin  < 7.0 ug/ml    5.0-20.0  









 Laboratory test  10/01/2019  [facility]  PSA Diagnostic  6.501 ng/
mL  High  0-4.0  12



 finding              









 1  This sample is drawn by:NB.

 

 2  Updated reference range on new analyzer 3-2017

 

 3  Updated reference range on new analyzer 3-2017

 

 4  Updated reference range 2019

 

 5  Updated Reference Range 

 

 6  Concerning GFR Guidelines for  Americans:



   Normal function or mild renal disease, if clinically at risk:  >/= 60



   mL/min



   Moderately decreased:  30-59



   Severely decreased:  15-29



   Renal failure:  <15



   There is reduced accuracy above 60ml/min/1.73 m squared, but the



   numeric value may be clinically useful in the near 60 range

 

 7  Concerning GFR Guidelines:



   Normal function or mild renal disease, if clinically at risk:  >/= 60



   mL/min



   Moderately decreased:  30-59



   Severely decreased:  15-29



   Renal failure:  <15



   There is reduced accuracy above 60ml/min/1.73 m squared, but the



   numeric value may be clinically useful in the near 60 range



   



   Glomerular Filtration Rate (GFR) is estimated based on the CKD-EPI



   equation, which assumes a steady state for creatinine as recommended



   by the National Kidney Disease Education Program in conjunction with



   the National Institutes of Health and the National Kidney Foundation.



   



   Clinical conditions in which it may be necessary to measure GFR by



   using clearance methods include extremes of age and body size, severe



   malnutrition or obesity, diseases of skeletal muscle, paraplegia or



   quadriplegia, vegetarian diet, rapidly changing kidney function, and



   calculation of the dose of potentially toxic drugs that are excreted



   by the kidneys.

 

 8  Per NCEP ATP III Guidelines:



   Results lower than 40 mg/dL are suggestive of increased risk for



   coronary artery disease.  Results > or = to 60 mg/dL are considered a



   negative risk factor.

 

 9  Per NCEP ATP III Guidelines:



   Normal Population <130



   Patients with medical conditions: CHD/DM



   Optimal: <100



   Borderline high: 130-159



   High: 160-189



   Very high: >189

 

 10  Clinical Guidelines for recommended serum 25(OH)Vitamin D



   Deficient at less than 20 ng/mL



   Insufficient at 20 to <30 ng/mL



   Sufficient at  ng/mL



   Toxicity at greater than 100 ng/mL

 

 11  Unable to calculate ratio. Microalbumin <7.0

 

 12  Serum levels of PSA measured using the [employer]



   DXI Hybritech immunoassay should not be interpreted as



   absolute evidence of the presence or absence of disease.



   The PSA value should be used in conjunction with other



   pertinent clinical diagnostic procedures.



   



   The values obtained with different assay methods or kits



   cannot be used interchangeably.







Procedures







 Date  Code  Description  Status

 

 10/08/2019  33272  Electrocardiogram Complete  Completed

 

 2010  [id number]  Colonoscopy  Completed







Medical Devices







 Description

 

 No Information Available







Encounters







 Description

 

 No Information Available







Assessments







 Date  Code  Description  Provider

 

 10/08/2019  E66.9  Obesity, unspecified  Claudia Taylor MD

 

 10/08/2019  Z00.01  Encounter for general adult medical  Claudia Taylor MD



     examination with abnormal findings  

 

 10/08/2019  I10  Essential (primary) hypertension  Claudia Taylor MD

 

 10/08/2019  E11.9  Type 2 diabetes mellitus without  Claudia Taylor MD



     complications  

 

 10/08/2019  E55.9  Vitamin D deficiency, unspecified  Claudia Taylor MD

 

 10/08/2019  I77.810  Thoracic aortic ectasia  Claudia Taylor MD

 

 10/08/2019  M48.07  Spinal stenosis, lumbosacral region  Claudia Taylor MD

 

 10/08/2019  E78.2  Mixed hyperlipidemia  Claudia Taylor MD

 

 10/08/2019  K21.0  Gastro-esophageal reflux disease with  Claudia Taylor MD



     esophagitis  

 

 10/08/2019  G47.33  Obstructive sleep apnea (adult) (pediatric)  Claudia Taylor MD

 

 10/08/2019  Z12.11  Encounter for screening for malignant  Claudia Taylor MD



     neoplasm of colon  

 

 10/08/2019  Z13.31  Encounter for screening for depression  Claudia Taylor MD

 

 10/08/2019  E83.42  Hypomagnesemia  Claudia Taylor MD

 

 10/08/2019  H81.10  Benign paroxysmal vertigo, unspecified ear  Claudia Taylor MD

 

 10/08/2019  Z68.36  Body mass index (BMI) 36.0-36.9, adult  Claudia Taylor MD

 

 10/08/2019  E11.9  Type 2 diabetes mellitus without  [facility]



     complications  







Plan of Treatment

Future Appointment(s):2020  8:15 am - Claudia Taylor MD at [facility]
/2019 - Claudia Taylor MDE66.9 Obesity, vaambdmjazvC45.01 Encounter for 
general adult medical examination with abnormal jpjhnivlU76 Essential (primary) 
gasyskttphpsK63.9 Type 2 diabetes mellitus without ucdpwderovjlkM80.9 Vitamin D 
deficiency, opvcmvhlzsqT25.810 Thoracic aortic chvhepxK92.07 Spinal stenosis, 
lumbosacral kvxkpwI11.2 Mixed hyperlipidemiaFollow up:4 mosK21.0 Gastro-
esophageal reflux disease with uxfypkrwfikX64.33 Obstructive sleep apnea (adult
) (pediatric)Z12.11 Encounter for screening for malignant neoplasm of 
izzrkW27.31 Encounter for screening for qrfotvlumaW58.42 WhomawrogevbpwG80.10 
Benign paroxysmal vertigo, unspecified earNew Medication:Meclizine HCL 25 mg - 
take 1/2-1 tablet by mouth three times daily as ebveilW05.36 Body mass index (
BMI) 36.0-36.9, adult



Functional Status







 Description

 

 No Information Available







Mental Status







 Description

 

 No Information Available







Referrals







 Description

 

 No Information Available